# Patient Record
Sex: FEMALE | Race: WHITE
[De-identification: names, ages, dates, MRNs, and addresses within clinical notes are randomized per-mention and may not be internally consistent; named-entity substitution may affect disease eponyms.]

---

## 2017-07-17 ENCOUNTER — HOSPITAL ENCOUNTER (OUTPATIENT)
Dept: HOSPITAL 84 - D.MAMMO | Age: 72
Discharge: HOME | End: 2017-07-17
Attending: FAMILY MEDICINE
Payer: MEDICARE

## 2017-07-17 VITALS — BODY MASS INDEX: 29.1 KG/M2

## 2017-07-17 DIAGNOSIS — Z12.31: Primary | ICD-10-CM

## 2017-09-29 ENCOUNTER — HOSPITAL ENCOUNTER (OUTPATIENT)
Dept: HOSPITAL 84 - D.OPS | Age: 72
Discharge: HOME | End: 2017-09-29
Attending: ORTHOPAEDIC SURGERY
Payer: MEDICARE

## 2017-09-29 VITALS — BODY MASS INDEX: 28.1 KG/M2

## 2017-09-29 VITALS — SYSTOLIC BLOOD PRESSURE: 126 MMHG | DIASTOLIC BLOOD PRESSURE: 83 MMHG

## 2017-09-29 DIAGNOSIS — I10: ICD-10-CM

## 2017-09-29 DIAGNOSIS — Z01.812: ICD-10-CM

## 2017-09-29 DIAGNOSIS — K21.9: ICD-10-CM

## 2017-09-29 DIAGNOSIS — G56.01: Primary | ICD-10-CM

## 2017-09-29 LAB
ERYTHROCYTE [DISTWIDTH] IN BLOOD BY AUTOMATED COUNT: 11.9 % (ref 11.5–14.5)
HCT VFR BLD CALC: 39.8 % (ref 36–48)
HGB BLD-MCNC: 13.7 G/DL (ref 12–16)
MCH RBC QN AUTO: 32.9 PG (ref 26–34)
MCHC RBC AUTO-ENTMCNC: 34.4 G/DL (ref 31–37)
MCV RBC: 95.7 FL (ref 80–100)
PLATELET # BLD: 191 10X3/UL (ref 130–400)
PMV BLD AUTO: 9.1 FL (ref 7.4–10.4)
RBC # BLD AUTO: 4.16 10X6/UL (ref 4–5.4)
WBC # BLD AUTO: 4.6 10X3/UL (ref 4.8–10.8)

## 2017-09-29 NOTE — NUR
THE PATIENT WAS OFFERED SOME DEMEROL FOR SHAKING BUT REQUESTED NOT TO
HAVE IT DUE TO THE POSSIBILITY OF NAUSEA WITH NARCOTICS

## 2017-09-29 NOTE — NUR
0088--DISCHARGE INSTRUCTIONS GIVEN, PT VERBALIZES UNDERSTANDING. PT
OFF UNIT VIA [facility]ERTO. ELAINE AMBROSE

## 2017-09-29 NOTE — OP
PATIENT NAME:  RACHEL GRANT                          MEDICAL RECORD: [id number]
:41                                             LOCATION:D.OPS          
                                                         ADMISSION DATE:        
SURGEON:  LESLIE ORR DO         
 
 
DATE OF OPERATION:  2017
 
PROCEDURE PERFORMED:  Right endoscopic carpal tunnel release.
 
PREOPERATIVE DIAGNOSIS:  Right carpal tunnel syndrome.
 
POSTOPERATIVE DIAGNOSIS:  Right carpal tunnel syndrome.
 
INDICATIONS:  Ms. Grant is a 72-year-old female who has had right hand
numbness for quite some time.  She even had an EMG, nerve conduction study in
 that indicated she had moderate carpal tunnel syndrome.  She repeated that
study recently and has had severe carpal tunnel on the right and the left.  She
decided with the right first since it has been hurting for longer.  She was
consented for the procedure yesterday and underwent the procedure today.  She
has understood all the risks and benefits of the procedure.
 
SURGEON:  Leslie Orr DO
 
FIRST ASSISTANT:  Yoanna Gutierrez, advanced practitioner. 
 
DESCRIPTION OF PROCEDURE:  The right hand was identified.  The patient was taken
to the operative suite, placed in supine position.  Once this was done, timeout
was performed and everybody was in agreement.  The right hand was prepped and
draped and a tourniquet above the elbow was placed under the drapes.  Once
everyone was in agreement with the timeout, she was given 2 grams Ancef
preoperatively.  A tourniquet was then used, Esmarch was used to exsanguinate
the right upper extremity and tourniquet was inflated.
 
Procedure commenced with a 1 cm incision over the palmaris longus tendon at the
wrist just proximal to the wrist crease.  Ragnell dissection was made after the
incision was made through the skin bluntly down to the carpal tunnel itself. 
The proximal forearm fascia at the incision was then released with pickups and
scissors.  Once this was done, a dilator was used to enter the carpal tunnel and
then a sheath was placed into the carpal tunnel.  The camera was then placed in
the sheath.  Carpal tunnel was identified.  A rasp was used to clean out the
transverse carpal ligament to ensure there is nothing in the way and then the
blade was brought down the sheath and was released under direct visualization. 
The transverse carpal ligament fat was herniated into the site and then the
scope and the blade were removed.  A Ragnell was used to thin and Ragnells were
used to lift up the skin and the remaining part that was proximal to the
endoscopic incision was released using scissors and then the whole transverse
carpal ligament was visualized with loupe magnification and had been released. 
Once this was done, an injection was performed with 0.25% Marcaine with 1%
epinephrine, 7mL were used around the incision site and up into the palm to help
with pain.  The incision site was then irrigated and tourniquet was let down at
9 minutes.  Pressure was held and there was not much bleeding.  The incision was
then closed using 5-0 Monocryl in inverted interrupted fashion and Steri-Strips
placed over the site, Adaptic, 4 x 4s was placed over that with Kerlix around
that and then Coban was lightly placed around the wrist and the palm.  The
patient was awakened and taken to recovery in stable condition.  Blood loss was
minimal.
 
 
 
 
OPERATIVE REPORT                               [id number]    RACHEL GRANT        
 
 
TRANSINT:[id number] Voice Confirmation ID: [id number] DOCUMENT ID: [id number]
                                           
                                           LESLIE ORR DO         
 
 
 
Electronically Signed by LESLIE HOPKINS on 17 at 1910
 
 
 
 
 
 
 
 
 
 
 
 
 
 
 
 
 
 
 
 
 
 
 
 
 
 
 
 
 
 
 
 
 
 
 
 
 
 
 
 
CC:                                                             [id number]
DICTATION DATE: 17 1144     :     17 1321      [facility] 
                                                                      17
[facility]                                          
[address] [place] [address]

## 2017-09-29 NOTE — NUR
1330--PT VOIDS, IV DC'D. PT REPORTS PAIN IS STARTING TO FEEL BETTER,
RATES PAIN 3/10. PT DRESSING AT THIS TIME. ELAINE RN

## 2017-10-26 LAB
ANION GAP SERPL CALC-SCNC: 12.8 MMOL/L (ref 8–16)
BUN SERPL-MCNC: 23 MG/DL (ref 7–18)
CALCIUM SERPL-MCNC: 9.4 MG/DL (ref 8.5–10.1)
CHLORIDE SERPL-SCNC: 103 MMOL/L (ref 98–107)
CO2 SERPL-SCNC: 26.9 MMOL/L (ref 21–32)
CREAT SERPL-MCNC: 0.9 MG/DL (ref 0.6–1.3)
ERYTHROCYTE [DISTWIDTH] IN BLOOD BY AUTOMATED COUNT: 11.6 % (ref 11.5–14.5)
GLUCOSE SERPL-MCNC: 97 MG/DL (ref 74–106)
HCT VFR BLD CALC: 39 % (ref 36–48)
HGB BLD-MCNC: 13.3 G/DL (ref 12–16)
MCH RBC QN AUTO: 33 PG (ref 26–34)
MCHC RBC AUTO-ENTMCNC: 34.1 G/DL (ref 31–37)
MCV RBC: 96.8 FL (ref 80–100)
OSMOLALITY SERPL CALC.SUM OF ELEC: 281 MOSM/KG (ref 275–300)
PLATELET # BLD: 203 10X3/UL (ref 130–400)
PMV BLD AUTO: 9 FL (ref 7.4–10.4)
POTASSIUM SERPL-SCNC: 3.7 MMOL/L (ref 3.5–5.1)
RBC # BLD AUTO: 4.03 10X6/UL (ref 4–5.4)
SODIUM SERPL-SCNC: 139 MMOL/L (ref 136–145)
WBC # BLD AUTO: 4.7 10X3/UL (ref 4.8–10.8)

## 2017-10-27 ENCOUNTER — HOSPITAL ENCOUNTER (OUTPATIENT)
Dept: HOSPITAL 84 - D.OPS | Age: 72
Discharge: HOME | End: 2017-10-27
Attending: ORTHOPAEDIC SURGERY
Payer: MEDICARE

## 2017-10-27 VITALS — SYSTOLIC BLOOD PRESSURE: 123 MMHG | DIASTOLIC BLOOD PRESSURE: 77 MMHG

## 2017-10-27 VITALS — BODY MASS INDEX: 28.1 KG/M2

## 2017-10-27 DIAGNOSIS — G56.02: Primary | ICD-10-CM

## 2017-10-27 DIAGNOSIS — I10: ICD-10-CM

## 2017-10-27 DIAGNOSIS — Z01.812: ICD-10-CM

## 2017-10-27 NOTE — OP
PATIENT NAME:  RACHEL GRANT                          MEDICAL RECORD: [id number]
:41                                             LOCATION:D.OPS          
                                                         ADMISSION DATE:        
SURGEON:  LESLIE ORR DO         
 
 
DATE OF OPERATION:  10/27/2017
 
PROCEDURE PERFORMED:  Left endoscopic carpal tunnel release.
 
PREOPERATIVE DIAGNOSIS:  Left carpal tunnel syndrome.
 
POSTOPERATIVE DIAGNOSIS:  Left carpal tunnel syndrome.
 
INDICATIONS:  This patient is a 72-year-old female who presented to me about a
month ago with bilateral carpal tunnel syndrome.  She had a nerve conduction
study, which showed decreased distal motor latency in the median nerve of
bilateral upper extremities.  The right was bothering her more than the left, so
she elected to do the right one first, which was a few weeks ago and then was
signed up to do the left one as well today.  She is aware of the risks and
benefits of procedure and decided to proceed forward with the procedure.
 
SURGEON:  Leslie Orr DO
 
ESTIMATED BLOOD LOSS:  Minimal.
 
COMPLICATIONS:  None.
 
DESCRIPTION OF PROCEDURE:  The patient was taken to the operative suite, placed
in supine position, given general anesthetic and Ancef antibiotic 2 grams.  The
left upper extremity was prepped and draped in sterile fashion.  A timeout was
performed, everyone was in agreement that the left side was the correct side and
that the correct procedure and correct patient.  Once this was done, the
proximal wrist crease was noted and the incision was made just proximal to that
over the palmaris longus tendon about a centimeter in length.  Blunt dissection
was then made down to the carpal tunnel itself using Ragnell.  The proximal
forearm fascia was released proximally and then the dilators were used to dilate
the carpal tunnel to accommodate the sheath.  The sheath was then entered into
the carpal tunnel and the camera was entered noting the only transverse carpal
ligament.  The rasp and probe were used to ensure that that is all that was
above the path of the blade.  The blade was then entered into the carpal tunnel
and the sheath and the transverse carpal ligament was incised with a cutting
retrograde.  This was followed directly with the camera and done under direct
visualization.  Fat and muscle had herniated into the site and then the sheath
and blade were withdrawn as well as the camera.  Once this was done, a Aleah was
used to elevate the skin with loupe magnification and light, the transverse
carpal ligament was visualized and any remaining fibers was disrupted using a
scissors.  The whole transverse carpal ligament was seen to be incised and this
was done under direct visualization.  The tourniquet was then let down at 12
minutes that had been inflated prior to the incision.  A Malis bipolar was used
to coagulate any bleeding vessels.  Once this was done, the wound was closed
using 5-0 Monocryl in an inverted interrupted fashion.  A Steri-Strip was placed
over the wound.  Adaptic, 4 x 4s and Kerlix were then wrapped and then Coban was
lightly placed over the Kerlix.  Prior to the dressings be put on, 5 mL of 0.5%
Marcaine were injected around the carpal tunnel site itself.
 
TRANSINT:[id number] Voice Confirmation ID: [id number] DOCUMENT ID: [id number]
 
 
 
OPERATIVE REPORT                               [id number]    RACHEL GRANT MICHAEL D, DO         
 
 
 
Electronically Signed by LESLIE HOPKINS on 10/27/17 at 1552
 
 
 
 
 
 
 
 
 
 
 
 
 
 
 
 
 
 
 
 
 
 
 
 
 
 
 
 
 
 
 
 
 
 
 
 
 
 
 
 
 
CC:                                                             [id number]
DICTATION DATE: 10/27/17 1205     :     10/27/17 1229      [facility] [id number] [place] [address]

## 2017-10-27 NOTE — NUR
1305 REPORT FROM [facility] RESTING IN BED LEFT ARM CARPAL TUNNEL
DRESSING C/D/I ICE ON DRESSING NO BLEEDING ARM ELEVATED.DENIES PAIN
OR NAUSEA,

## 2018-07-23 ENCOUNTER — HOSPITAL ENCOUNTER (OUTPATIENT)
Dept: HOSPITAL 84 - D.MAMMO | Age: 73
Discharge: HOME | End: 2018-07-23
Attending: FAMILY MEDICINE
Payer: MEDICARE

## 2018-07-23 VITALS — BODY MASS INDEX: 28.1 KG/M2

## 2018-07-23 DIAGNOSIS — Z12.31: Primary | ICD-10-CM

## 2019-09-05 ENCOUNTER — HOSPITAL ENCOUNTER (OUTPATIENT)
Dept: HOSPITAL 84 - D.MAMMO | Age: 74
Discharge: HOME | End: 2019-09-05
Attending: INTERNAL MEDICINE
Payer: MEDICARE

## 2019-09-05 VITALS — BODY MASS INDEX: 28.1 KG/M2

## 2019-09-05 DIAGNOSIS — Z12.31: Primary | ICD-10-CM

## 2021-02-22 ENCOUNTER — HOSPITAL ENCOUNTER (OUTPATIENT)
Dept: HOSPITAL 84 - D.MAMMO | Age: 76
Discharge: HOME | End: 2021-02-22
Attending: INTERNAL MEDICINE
Payer: MEDICARE

## 2021-02-22 VITALS — BODY MASS INDEX: 28.1 KG/M2

## 2021-02-22 DIAGNOSIS — R92.8: Primary | ICD-10-CM

## 2021-03-10 ENCOUNTER — HOSPITAL ENCOUNTER (OUTPATIENT)
Dept: HOSPITAL 84 - D.US | Age: 76
Discharge: HOME | End: 2021-03-10
Attending: SURGERY
Payer: MEDICARE

## 2021-03-10 VITALS — BODY MASS INDEX: 28.1 KG/M2

## 2021-03-10 DIAGNOSIS — N63.42: Primary | ICD-10-CM
